# Patient Record
Sex: FEMALE | Race: BLACK OR AFRICAN AMERICAN | NOT HISPANIC OR LATINO | URBAN - METROPOLITAN AREA
[De-identification: names, ages, dates, MRNs, and addresses within clinical notes are randomized per-mention and may not be internally consistent; named-entity substitution may affect disease eponyms.]

---

## 2017-05-05 ENCOUNTER — ON CAMPUS - OUTPATIENT (OUTPATIENT)
Dept: URBAN - METROPOLITAN AREA HOSPITAL 85 | Facility: HOSPITAL | Age: 22
End: 2017-05-05
Payer: COMMERCIAL

## 2017-05-05 DIAGNOSIS — R11.2 NAUSEA WITH VOMITING, UNSPECIFIED: ICD-10-CM

## 2017-05-05 DIAGNOSIS — Z33.1 PREGNANT STATE, INCIDENTAL: ICD-10-CM

## 2017-05-05 DIAGNOSIS — R10.9 UNSPECIFIED ABDOMINAL PAIN: ICD-10-CM

## 2017-05-05 DIAGNOSIS — D72.829 ELEVATED WHITE BLOOD CELL COUNT, UNSPECIFIED: ICD-10-CM

## 2017-05-05 DIAGNOSIS — K59.00 CONSTIPATION, UNSPECIFIED: ICD-10-CM

## 2017-05-05 PROCEDURE — 99203 OFFICE O/P NEW LOW 30 MIN: CPT | Performed by: NURSE PRACTITIONER

## 2017-05-06 ENCOUNTER — ON CAMPUS - OUTPATIENT (OUTPATIENT)
Dept: URBAN - METROPOLITAN AREA HOSPITAL 85 | Facility: HOSPITAL | Age: 22
End: 2017-05-06
Payer: COMMERCIAL

## 2017-05-06 DIAGNOSIS — D72.829 ELEVATED WHITE BLOOD CELL COUNT, UNSPECIFIED: ICD-10-CM

## 2017-05-06 DIAGNOSIS — K59.00 CONSTIPATION, UNSPECIFIED: ICD-10-CM

## 2017-05-06 DIAGNOSIS — R11.2 NAUSEA WITH VOMITING, UNSPECIFIED: ICD-10-CM

## 2017-05-06 DIAGNOSIS — Z33.1 PREGNANT STATE, INCIDENTAL: ICD-10-CM

## 2017-05-06 DIAGNOSIS — R10.9 UNSPECIFIED ABDOMINAL PAIN: ICD-10-CM

## 2017-05-06 PROCEDURE — 99213 OFFICE O/P EST LOW 20 MIN: CPT | Performed by: INTERNAL MEDICINE

## 2017-05-27 ENCOUNTER — ON CAMPUS - OUTPATIENT (OUTPATIENT)
Dept: URBAN - METROPOLITAN AREA HOSPITAL 77 | Facility: HOSPITAL | Age: 22
End: 2017-05-27
Payer: COMMERCIAL

## 2017-05-27 DIAGNOSIS — Z33.1 PREGNANT STATE, INCIDENTAL: ICD-10-CM

## 2017-05-27 DIAGNOSIS — R11.2 NAUSEA WITH VOMITING, UNSPECIFIED: ICD-10-CM

## 2017-05-27 PROCEDURE — 99224: CPT | Performed by: INTERNAL MEDICINE

## 2017-06-30 ENCOUNTER — INPATIENT HOSPITAL (OUTPATIENT)
Dept: URBAN - METROPOLITAN AREA HOSPITAL 84 | Facility: HOSPITAL | Age: 22
End: 2017-06-30
Payer: COMMERCIAL

## 2017-06-30 DIAGNOSIS — R11.2 NAUSEA WITH VOMITING, UNSPECIFIED: ICD-10-CM

## 2017-06-30 DIAGNOSIS — Z3A.14 14 WEEKS GESTATION OF PREGNANCY: ICD-10-CM

## 2017-06-30 DIAGNOSIS — R63.4 ABNORMAL WEIGHT LOSS: ICD-10-CM

## 2017-06-30 DIAGNOSIS — R10.9 UNSPECIFIED ABDOMINAL PAIN: ICD-10-CM

## 2017-06-30 DIAGNOSIS — E87.6 HYPOKALEMIA: ICD-10-CM

## 2017-06-30 PROCEDURE — 99222 1ST HOSP IP/OBS MODERATE 55: CPT | Performed by: NURSE PRACTITIONER

## 2017-07-01 ENCOUNTER — INPATIENT HOSPITAL (OUTPATIENT)
Dept: URBAN - METROPOLITAN AREA HOSPITAL 84 | Facility: HOSPITAL | Age: 22
End: 2017-07-01
Payer: COMMERCIAL

## 2017-07-01 DIAGNOSIS — R11.2 NAUSEA WITH VOMITING, UNSPECIFIED: ICD-10-CM

## 2017-07-01 DIAGNOSIS — E87.6 HYPOKALEMIA: ICD-10-CM

## 2017-07-01 DIAGNOSIS — R10.9 UNSPECIFIED ABDOMINAL PAIN: ICD-10-CM

## 2017-07-01 DIAGNOSIS — Z3A.14 14 WEEKS GESTATION OF PREGNANCY: ICD-10-CM

## 2017-07-01 DIAGNOSIS — R63.4 ABNORMAL WEIGHT LOSS: ICD-10-CM

## 2017-07-01 PROCEDURE — 99231 SBSQ HOSP IP/OBS SF/LOW 25: CPT | Performed by: INTERNAL MEDICINE

## 2017-07-02 PROCEDURE — 99231 SBSQ HOSP IP/OBS SF/LOW 25: CPT | Performed by: INTERNAL MEDICINE

## 2017-07-03 ENCOUNTER — INPATIENT HOSPITAL (OUTPATIENT)
Dept: URBAN - METROPOLITAN AREA HOSPITAL 84 | Facility: HOSPITAL | Age: 22
End: 2017-07-03
Payer: COMMERCIAL

## 2017-07-03 DIAGNOSIS — R63.4 ABNORMAL WEIGHT LOSS: ICD-10-CM

## 2017-07-03 DIAGNOSIS — E87.6 HYPOKALEMIA: ICD-10-CM

## 2017-07-03 DIAGNOSIS — Z3A.14 14 WEEKS GESTATION OF PREGNANCY: ICD-10-CM

## 2017-07-03 DIAGNOSIS — R11.2 NAUSEA WITH VOMITING, UNSPECIFIED: ICD-10-CM

## 2017-07-03 DIAGNOSIS — R10.9 UNSPECIFIED ABDOMINAL PAIN: ICD-10-CM

## 2017-07-03 PROCEDURE — 99231 SBSQ HOSP IP/OBS SF/LOW 25: CPT | Performed by: NURSE PRACTITIONER

## 2017-07-26 ENCOUNTER — INPATIENT HOSPITAL (OUTPATIENT)
Dept: URBAN - METROPOLITAN AREA HOSPITAL 84 | Facility: HOSPITAL | Age: 22
End: 2017-07-26
Payer: COMMERCIAL

## 2017-07-26 DIAGNOSIS — R10.10 UPPER ABDOMINAL PAIN, UNSPECIFIED: ICD-10-CM

## 2017-07-26 DIAGNOSIS — K59.00 CONSTIPATION, UNSPECIFIED: ICD-10-CM

## 2017-07-26 DIAGNOSIS — K21.9 GASTRO-ESOPHAGEAL REFLUX DISEASE WITHOUT ESOPHAGITIS: ICD-10-CM

## 2017-07-26 DIAGNOSIS — R11.2 NAUSEA WITH VOMITING, UNSPECIFIED: ICD-10-CM

## 2017-07-26 PROCEDURE — 99222 1ST HOSP IP/OBS MODERATE 55: CPT | Performed by: NURSE PRACTITIONER

## 2017-07-27 ENCOUNTER — INPATIENT HOSPITAL (OUTPATIENT)
Dept: URBAN - METROPOLITAN AREA HOSPITAL 84 | Facility: HOSPITAL | Age: 22
End: 2017-07-27
Payer: COMMERCIAL

## 2017-07-27 DIAGNOSIS — K21.9 GASTRO-ESOPHAGEAL REFLUX DISEASE WITHOUT ESOPHAGITIS: ICD-10-CM

## 2017-07-27 DIAGNOSIS — R11.2 NAUSEA WITH VOMITING, UNSPECIFIED: ICD-10-CM

## 2017-07-27 DIAGNOSIS — K59.00 CONSTIPATION, UNSPECIFIED: ICD-10-CM

## 2017-07-27 DIAGNOSIS — R10.10 UPPER ABDOMINAL PAIN, UNSPECIFIED: ICD-10-CM

## 2017-07-27 PROCEDURE — 99232 SBSQ HOSP IP/OBS MODERATE 35: CPT | Performed by: NURSE PRACTITIONER

## 2017-07-28 PROCEDURE — 99232 SBSQ HOSP IP/OBS MODERATE 35: CPT | Performed by: NURSE PRACTITIONER

## 2018-04-15 ENCOUNTER — ON CAMPUS - OUTPATIENT (OUTPATIENT)
Dept: URBAN - METROPOLITAN AREA HOSPITAL 85 | Facility: HOSPITAL | Age: 23
End: 2018-04-15
Payer: COMMERCIAL

## 2018-04-15 DIAGNOSIS — D72.829 ELEVATED WHITE BLOOD CELL COUNT, UNSPECIFIED: ICD-10-CM

## 2018-04-15 DIAGNOSIS — R11.2 NAUSEA WITH VOMITING, UNSPECIFIED: ICD-10-CM

## 2018-04-15 PROCEDURE — 99222 1ST HOSP IP/OBS MODERATE 55: CPT | Performed by: NURSE PRACTITIONER

## 2018-04-15 PROCEDURE — 99213 OFFICE O/P EST LOW 20 MIN: CPT | Performed by: NURSE PRACTITIONER

## 2018-04-30 ENCOUNTER — OFFICE (OUTPATIENT)
Dept: URBAN - METROPOLITAN AREA CLINIC 64 | Facility: CLINIC | Age: 23
End: 2018-04-30
Payer: COMMERCIAL

## 2018-04-30 VITALS
DIASTOLIC BLOOD PRESSURE: 71 MMHG | SYSTOLIC BLOOD PRESSURE: 122 MMHG | WEIGHT: 210 LBS | HEART RATE: 74 BPM | HEIGHT: 65 IN

## 2018-04-30 DIAGNOSIS — R11.0 NAUSEA: ICD-10-CM

## 2018-04-30 DIAGNOSIS — R68.81 EARLY SATIETY: ICD-10-CM

## 2018-04-30 DIAGNOSIS — R10.13 EPIGASTRIC PAIN: ICD-10-CM

## 2018-04-30 DIAGNOSIS — K21.9 GASTRO-ESOPHAGEAL REFLUX DISEASE WITHOUT ESOPHAGITIS: ICD-10-CM

## 2018-04-30 DIAGNOSIS — F12.90 CANNABIS USE, UNSPECIFIED, UNCOMPLICATED: ICD-10-CM

## 2018-04-30 PROCEDURE — 99214 OFFICE O/P EST MOD 30 MIN: CPT | Performed by: NURSE PRACTITIONER

## 2018-04-30 RX ORDER — PANTOPRAZOLE SODIUM 40 MG/1
40 TABLET, DELAYED RELEASE ORAL
Qty: 30 | Refills: 3 | Status: ACTIVE
Start: 2018-04-30

## 2018-05-29 ENCOUNTER — ON CAMPUS - OUTPATIENT (OUTPATIENT)
Dept: URBAN - METROPOLITAN AREA HOSPITAL 85 | Facility: HOSPITAL | Age: 23
End: 2018-05-29
Payer: COMMERCIAL

## 2018-05-29 ENCOUNTER — HOSPITAL ENCOUNTER (OUTPATIENT)
Dept: PREOP | Facility: HOSPITAL | Age: 23
Setting detail: HOSPITAL OUTPATIENT SURGERY
Discharge: HOME | End: 2018-05-29
Attending: INTERNAL MEDICINE | Admitting: INTERNAL MEDICINE

## 2018-05-29 DIAGNOSIS — R11.2 NAUSEA WITH VOMITING, UNSPECIFIED: ICD-10-CM

## 2018-05-29 DIAGNOSIS — R10.9 UNSPECIFIED ABDOMINAL PAIN: ICD-10-CM

## 2018-05-29 PROCEDURE — 43235 EGD DIAGNOSTIC BRUSH WASH: CPT | Performed by: INTERNAL MEDICINE

## 2018-07-11 ENCOUNTER — INPATIENT HOSPITAL (OUTPATIENT)
Dept: URBAN - METROPOLITAN AREA HOSPITAL 84 | Facility: HOSPITAL | Age: 23
End: 2018-07-11
Payer: COMMERCIAL

## 2018-07-11 DIAGNOSIS — R10.13 EPIGASTRIC PAIN: ICD-10-CM

## 2018-07-11 DIAGNOSIS — R11.2 NAUSEA WITH VOMITING, UNSPECIFIED: ICD-10-CM

## 2018-07-11 DIAGNOSIS — Z93.1 GASTROSTOMY STATUS: ICD-10-CM

## 2018-07-11 DIAGNOSIS — F31.9 BIPOLAR DISORDER, UNSPECIFIED: ICD-10-CM

## 2018-07-11 DIAGNOSIS — F12.90 CANNABIS USE, UNSPECIFIED, UNCOMPLICATED: ICD-10-CM

## 2018-07-11 PROCEDURE — 99221 1ST HOSP IP/OBS SF/LOW 40: CPT | Performed by: NURSE PRACTITIONER

## 2018-07-12 ENCOUNTER — INPATIENT HOSPITAL (OUTPATIENT)
Dept: URBAN - METROPOLITAN AREA HOSPITAL 84 | Facility: HOSPITAL | Age: 23
End: 2018-07-12
Payer: COMMERCIAL

## 2018-07-12 DIAGNOSIS — Z3A.12 12 WEEKS GESTATION OF PREGNANCY: ICD-10-CM

## 2018-07-12 DIAGNOSIS — R11.2 NAUSEA WITH VOMITING, UNSPECIFIED: ICD-10-CM

## 2018-07-12 PROCEDURE — 43241 EGD TUBE/CATH INSERTION: CPT | Performed by: INTERNAL MEDICINE

## 2018-07-13 PROCEDURE — 99231 SBSQ HOSP IP/OBS SF/LOW 25: CPT | Performed by: NURSE PRACTITIONER

## 2018-07-24 ENCOUNTER — OFFICE (OUTPATIENT)
Dept: URBAN - METROPOLITAN AREA CLINIC 64 | Facility: CLINIC | Age: 23
End: 2018-07-24
Payer: COMMERCIAL

## 2018-07-24 VITALS
HEIGHT: 65 IN | DIASTOLIC BLOOD PRESSURE: 70 MMHG | SYSTOLIC BLOOD PRESSURE: 130 MMHG | HEART RATE: 96 BPM | WEIGHT: 194 LBS

## 2018-07-24 DIAGNOSIS — R11.2 NAUSEA WITH VOMITING, UNSPECIFIED: ICD-10-CM

## 2018-07-24 PROCEDURE — 99213 OFFICE O/P EST LOW 20 MIN: CPT | Performed by: NURSE PRACTITIONER

## 2019-10-02 ENCOUNTER — HOSPITAL ENCOUNTER (EMERGENCY)
Facility: HOSPITAL | Age: 24
Discharge: HOME | End: 2019-10-02
Admitting: EMERGENCY MEDICINE

## 2019-10-02 ENCOUNTER — APPOINTMENT (OUTPATIENT)
Dept: GENERAL RADIOLOGY | Facility: HOSPITAL | Age: 24
End: 2019-10-02

## 2019-10-02 VITALS
BODY MASS INDEX: 35.32 KG/M2 | HEART RATE: 76 BPM | OXYGEN SATURATION: 100 % | SYSTOLIC BLOOD PRESSURE: 139 MMHG | TEMPERATURE: 97.2 F | WEIGHT: 212 LBS | RESPIRATION RATE: 18 BRPM | DIASTOLIC BLOOD PRESSURE: 67 MMHG | HEIGHT: 65 IN

## 2019-10-02 DIAGNOSIS — M25.572 ACUTE LEFT ANKLE PAIN: Primary | ICD-10-CM

## 2019-10-02 DIAGNOSIS — S93.402A SPRAIN OF LEFT ANKLE, UNSPECIFIED LIGAMENT, INITIAL ENCOUNTER: ICD-10-CM

## 2019-10-02 PROCEDURE — 99284 EMERGENCY DEPT VISIT MOD MDM: CPT

## 2019-10-02 PROCEDURE — 73610 X-RAY EXAM OF ANKLE: CPT

## 2019-10-02 RX ORDER — ONDANSETRON 4 MG/1
4 TABLET, ORALLY DISINTEGRATING ORAL ONCE
Status: COMPLETED | OUTPATIENT
Start: 2019-10-02 | End: 2019-10-02

## 2019-10-02 RX ORDER — DICLOFENAC SODIUM 75 MG/1
75 TABLET, DELAYED RELEASE ORAL 2 TIMES DAILY PRN
Qty: 20 TABLET | Refills: 0 | Status: SHIPPED | OUTPATIENT
Start: 2019-10-02

## 2019-10-02 RX ORDER — HYDROCODONE BITARTRATE AND ACETAMINOPHEN 7.5; 325 MG/1; MG/1
1 TABLET ORAL ONCE
Status: COMPLETED | OUTPATIENT
Start: 2019-10-02 | End: 2019-10-02

## 2019-10-02 RX ADMIN — HYDROCODONE BITARTRATE AND ACETAMINOPHEN 1 TABLET: 7.5; 325 TABLET ORAL at 08:54

## 2019-10-02 RX ADMIN — ONDANSETRON 4 MG: 4 TABLET, ORALLY DISINTEGRATING ORAL at 08:54

## 2019-10-02 NOTE — DISCHARGE INSTRUCTIONS
CAM Walker until pain-free    Use diclofenac as needed for inflammation and pain do not mix with Motrin ibuprofen Advil or Aleve    Follow-up with Dr. white if still painful in 7 to 10 days return if worse

## 2019-10-02 NOTE — ED PROVIDER NOTES
Subjective   Patient is a 23-year-old female that was walking to work today when she stepped in a hole tripped and fell and twisted her left ankle.  She states she got up and took a few steps but then could not walk and had to call EMS.  Rates her pain a 7/10 she states it is a sharp shooting pain and radiates into the foot dates it is a constant pain            Review of Systems   Constitutional: Negative for chills, fatigue and fever.   HENT: Negative for congestion, tinnitus and trouble swallowing.    Eyes: Negative for photophobia, discharge and redness.   Respiratory: Negative for cough and shortness of breath.    Cardiovascular: Negative for chest pain and palpitations.   Gastrointestinal: Negative for abdominal pain, diarrhea, nausea and vomiting.   Genitourinary: Negative for dysuria, frequency and urgency.   Musculoskeletal: Negative for back pain, joint swelling and myalgias.        Left foot and ankle pain   Skin: Negative for rash.   Neurological: Negative for dizziness and headaches.   Psychiatric/Behavioral: Negative for confusion.   All other systems reviewed and are negative.      No past medical history on file.    Allergies   Allergen Reactions   • Sulfa Antibiotics Unknown (See Comments)     Pt reports her mother always told her she was allergic to sulfa. Pt states, she does not know reaction.       No past surgical history on file.    No family history on file.    Social History     Socioeconomic History   • Marital status: Single     Spouse name: Not on file   • Number of children: Not on file   • Years of education: Not on file   • Highest education level: Not on file           Objective   Physical Exam   Constitutional: She is oriented to person, place, and time. She appears well-developed and well-nourished.   HENT:   Head: Normocephalic and atraumatic.   Eyes: Conjunctivae and EOM are normal. Pupils are equal, round, and reactive to light.   Neck: Normal range of motion. Neck supple.  "  Cardiovascular: Normal rate, regular rhythm, normal heart sounds and intact distal pulses.   Pulmonary/Chest: Effort normal and breath sounds normal. No respiratory distress. She has no wheezes.   Abdominal: She exhibits no distension and no mass. There is no tenderness. There is no rebound and no guarding.   Musculoskeletal: Normal range of motion. She exhibits no deformity.        Neurological: She is alert and oriented to person, place, and time. She displays normal reflexes. No cranial nerve deficit or sensory deficit. GCS eye subscore is 4. GCS verbal subscore is 5. GCS motor subscore is 6.   Skin: Skin is warm and dry. Capillary refill takes less than 2 seconds.   Psychiatric: She has a normal mood and affect.       Procedures           ED Course  ED Course as of Oct 02 0940   Wed Oct 02, 2019   0939 She was told of the negative x-ray and the need to wear the cam walker till re-seen by the podiatrist and to return if worse she was given some diclofenac to go home she verbalized understood discharge instructions and the need for follow-up  [KW]      ED Course User Index  [KW] Seble Lewis, RAHEL        /78 (BP Location: Right arm, Patient Position: Sitting)   Pulse 80   Temp 98.2 °F (36.8 °C) (Oral)   Resp 17   Ht 165.1 cm (65\")   Wt 96.2 kg (212 lb)   LMP 09/21/2019 (Approximate)   SpO2 99%   BMI 35.28 kg/m²   Labs Reviewed - No data to display  Medications   HYDROcodone-acetaminophen (NORCO) 7.5-325 MG per tablet 1 tablet (1 tablet Oral Given 10/2/19 0854)   ondansetron ODT (ZOFRAN-ODT) disintegrating tablet 4 mg (4 mg Oral Given 10/2/19 0854)     Xr Ankle 3+ View Left    Result Date: 10/2/2019  Normal left ankle  Electronically Signed By-Dr. Janett Sparks MD On:10/2/2019 9:20 AM This report was finalized on [id number] by Dr. Janett Sparks MD.              MDM  Number of Diagnoses or Management Options  Acute left ankle pain:   Sprain of left ankle, unspecified ligament, initial " encounter:   Diagnosis management comments: She was placed in a tall cam walker and was distally neurovascularly intact after placement.       Amount and/or Complexity of Data Reviewed  Tests in the radiology section of CPT®: reviewed        Final diagnoses:   Acute left ankle pain   Sprain of left ankle, unspecified ligament, initial encounter              Seble Lewis, APRN  10/02/19 0940

## 2019-11-11 RX ORDER — PROMETHAZINE HYDROCHLORIDE 25 MG/1
TABLET ORAL
COMMUNITY
Start: 2016-10-17

## 2019-11-11 RX ORDER — GABAPENTIN 300 MG/1
1 CAPSULE ORAL EVERY 8 HOURS
COMMUNITY
Start: 2018-05-11

## 2020-01-08 ENCOUNTER — OFFICE VISIT (OUTPATIENT)
Dept: PSYCHIATRY | Facility: CLINIC | Age: 25
End: 2020-01-08

## 2020-01-08 DIAGNOSIS — F31.32 BIPOLAR 1 DISORDER, DEPRESSED, MODERATE (HCC): ICD-10-CM

## 2020-01-08 DIAGNOSIS — F90.2 ATTENTION DEFICIT HYPERACTIVITY DISORDER (ADHD), COMBINED TYPE: Primary | ICD-10-CM

## 2020-01-08 PROCEDURE — 99214 OFFICE O/P EST MOD 30 MIN: CPT | Performed by: PSYCHIATRY & NEUROLOGY

## 2020-01-08 RX ORDER — QUETIAPINE FUMARATE 100 MG/1
100 TABLET, FILM COATED ORAL NIGHTLY
Qty: 30 TABLET | Refills: 1 | Status: SHIPPED | OUTPATIENT
Start: 2020-01-08
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
## 2020-01-08 NOTE — PATIENT INSTRUCTIONS
Bipolar 1 Disorder  Bipolar 1 disorder is a mental health disorder in which a person has episodes of emotional highs (kelley), and may also have episodes of emotional lows (depression) in addition to highs. Bipolar 1 disorder is different from other bipolar disorders because it involves extreme manic episodes. These episodes last at least one week or involve symptoms that are so severe that hospitalization is needed to keep the person safe.  What increases the risk?  The cause of this condition is not known. However, certain factors make you more likely to have bipolar disorder, such as:  · Having a family member with the disorder.  · An imbalance of certain chemicals in the brain (neurotransmitters).  · Stress, such as illness, financial problems, or a death.  · Certain conditions that affect the brain or spinal cord (neurologic conditions).  · Brain injury (trauma).  · Having another mental health disorder, such as:  ? Obsessive compulsive disorder.  ? Schizophrenia.  What are the signs or symptoms?  Symptoms of kelley include:  · Very high self-esteem or self-confidence.  · Decreased need for sleep.  · Unusual talkativeness or feeling a need to keep talking. Speech may be very fast. It may seem like you cannot stop talking.  · Racing thoughts or constant talking, with quick shifts between topics that may or may not be related (flight of ideas).  · Decreased ability to focus or concentrate.  · Increased purposeful activity, such as work, studies, or social activity.  · Increased nonproductive activity. This could be pacing, squirming and fidgeting, or finger and toe tapping.  · Impulsive behavior and poor judgment. This may result in high-risk activities, such as having unprotected sex or spending a lot of money.  Symptoms of depression include:  · Feeling sad, hopeless, or helpless.  · Frequent or uncontrollable crying.  · Lack of feeling or caring about anything.  · Sleeping too much.  · Moving more slowly than  usual.  · Not being able to enjoy things you used to enjoy.  · Wanting to be alone all the time.  · Feeling guilty or worthless.  · Lack of energy or motivation.  · Trouble concentrating or remembering.  · Trouble making decisions.  · Increased appetite.  · Thoughts of death, or the desire to harm yourself.  Sometimes, you may have a mixed mood. This means having symptoms of depression and kelley. Stress can make symptoms worse.  How is this diagnosed?  To diagnose bipolar disorder, your health care provider may ask about your:  · Emotional episodes.  · Medical history.  · Alcohol and drug use. This includes prescription medicines. Certain medical conditions and substances can cause symptoms that seem like bipolar disorder (secondary bipolar disorder).  How is this treated?  Bipolar disorder is a long-term (chronic) illness. It is best controlled with ongoing (continuous) treatment rather than treatment only when symptoms occur. Treatment may include:  · Medicine. Medicine can be prescribed by a provider who specializes in treating mental disorders (psychiatrist).  ? Medicines called mood stabilizers are usually prescribed.  ? If symptoms occur even while taking a mood stabilizer, other medicines may be added.  · Psychotherapy. Some forms of talk therapy, such as cognitive-behavioral therapy (CBT), can provide support, education, and guidance.  · Coping methods, such as journaling or relaxation exercises. These may include:  ? Yoga.  ? Meditation.  ? Deep breathing.  · Lifestyle changes, such as:  ? Limiting alcohol and drug use.  ? Exercising regularly.  ? Getting plenty of sleep.  ? Making healthy eating choices.    A combination of medicine, talk therapy, and coping methods is best. A procedure in which electricity is applied to the brain through the scalp (electroconvulsive therapy) may be used in cases of severe kelley when medicine and psychotherapy work too slowly or do not work.  Follow these instructions at  home:  Activity    · Return to your normal activities as told by your health care provider.  · Find activities that you enjoy, and make time to do them.  · Exercise regularly as told by your health care provider.  Lifestyle  · Limit alcohol intake to no more than 1 drink a day for nonpregnant women and 2 drinks a day for men. One drink equals 12 oz of beer, 5 oz of wine, or 1½ oz of hard liquor.  · Follow a set schedule for eating and sleeping.  · Eat a balanced diet that includes fresh fruits and vegetables, whole grains, low-fat dairy, and lean meat.  · Get 7-8 hours of sleep each night.  General instructions  · Take over-the-counter and prescription medicines only as told by your health care provider.  · Think about joining a support group. Your health care provider may be able to recommend a support group.  · Talk with your family and loved ones about your treatment goals and how they can help.  · Keep all follow-up visits as told by your health care provider. This is important.  Where to find more information  For more information about bipolar disorder, visit the following websites:  · National [place] on Mental Illness: www.[place].org  · [place] of [place] [employer]: www.nimh.nih.gov  Contact a health care provider if:  · Your symptoms get worse.  · You have side effects from your medicine, and they get worse.  · You have trouble sleeping.  · You have trouble doing daily activities.  · You feel unsafe in your surroundings.  · You are dealing with substance abuse.  Get help right away if:  · You have new symptoms.  · You have thoughts about harming yourself.  · You self-harm.  This information is not intended to replace advice given to you by your health care provider. Make sure you discuss any questions you have with your health care provider.  Document Released: 03/26/2002 Document Revised: 08/13/2017 Document Reviewed: 08/17/2017  [employer] Interactive Patient Education © 2019 [employer]

## 2020-01-09 RX ORDER — ARIPIPRAZOLE 2 MG/1
TABLET ORAL
Qty: 30 TABLET | Refills: 1 | Status: SHIPPED | OUTPATIENT
Start: 2020-01-09

## 2020-11-30 ENCOUNTER — TELEPHONE (OUTPATIENT)
Dept: PSYCHIATRY | Facility: CLINIC | Age: 25
End: 2020-11-30